# Patient Record
Sex: MALE | Race: BLACK OR AFRICAN AMERICAN | NOT HISPANIC OR LATINO | URBAN - METROPOLITAN AREA
[De-identification: names, ages, dates, MRNs, and addresses within clinical notes are randomized per-mention and may not be internally consistent; named-entity substitution may affect disease eponyms.]

---

## 2017-04-06 ENCOUNTER — EMERGENCY (EMERGENCY)
Facility: HOSPITAL | Age: 52
LOS: 1 days | Discharge: HOME | End: 2017-04-06
Attending: EMERGENCY MEDICINE | Admitting: EMERGENCY MEDICINE
Payer: SELF-PAY

## 2017-04-06 VITALS
SYSTOLIC BLOOD PRESSURE: 175 MMHG | OXYGEN SATURATION: 100 % | HEART RATE: 123 BPM | RESPIRATION RATE: 16 BRPM | TEMPERATURE: 99 F | DIASTOLIC BLOOD PRESSURE: 114 MMHG

## 2017-04-06 VITALS
HEART RATE: 91 BPM | OXYGEN SATURATION: 96 % | RESPIRATION RATE: 18 BRPM | SYSTOLIC BLOOD PRESSURE: 132 MMHG | DIASTOLIC BLOOD PRESSURE: 68 MMHG

## 2017-04-06 PROCEDURE — 99053 MED SERV 10PM-8AM 24 HR FAC: CPT

## 2017-04-06 PROCEDURE — 99283 EMERGENCY DEPT VISIT LOW MDM: CPT | Mod: 25

## 2017-04-06 NOTE — ED ADULT TRIAGE NOTE - CHIEF COMPLAINT QUOTE
pt comes to ED by EMS & [employer]. EMS was called by a bystander pt was screaming outside of a neighbor's house. pt denies ETOH pt appears intoxicated. pt is not making sense in triage. pt has DM FS in triage is 90. pt HR is elevated in triage and BP is elevated. EKG performed in triage. pt comes to ED by EMS & [employer]. EMS was called by a bystander pt was screaming outside of a neighbor's house. pt denies ETOH pt appears intoxicated. pt is not making sense in triage pt is A&0x1 at this time. pt knows his name pt does not know where he is or the year.  pt has DM FS in triage is 90. pt HR is elevated in triage and BP is elevated. EKG performed in triage. ROSAMARIA called to assess the patient in triage. No code stroke called in triage.

## 2017-04-06 NOTE — ED ADULT NURSE REASSESSMENT NOTE - NS ED NURSE REASSESS COMMENT FT1
Patient sleeping in bed and appears to be in no apparent distress.  Vitals taken and will continue to monitor patient

## 2017-04-06 NOTE — ED PROVIDER NOTE - PROGRESS NOTE DETAILS
pt is alert and clinically sober at this time with normal speech and gait.  Requesting to go home.  Will take a taxi and knows his address.

## 2017-04-06 NOTE — ED PROVIDER NOTE - ENMT, MLM
Airway patent, Nasal mucosa clear. Mouth with normal mucosa. Throat has no vesicles, no oropharyngeal exudates and uvula is midline. ETOH odor on breath.

## 2017-04-06 NOTE — ED ADULT NURSE NOTE - OBJECTIVE STATEMENT
Patient brought into ED by EMS and [employer] for ETOH intoxication.  Patient is a 52 y/o male, awake, A&O x 3, NAD, EMS and PD called by neighbor because patient was yelling out of the house.  Patient appears to be intoxicated with a smell of alcohol noted.  Patient urinated on himself.  Patient states, he was walking home after drinking ETOH and PD stopped him.  Patient denies any past medical history and denies any symptoms at this time.  Patient changed into a gown with assistance from IFEOMA Andersen and patient belongings placed in cabinet across from rm 21.  Vitals taken and will continue to monitor patient.

## 2017-04-06 NOTE — ED PROVIDER NOTE - OBJECTIVE STATEMENT
52 y/o m bibems for agitation. Patient was yelling outside neighbors home. States he drank etoh, does not know how much.  Denies any medical complaints, falls, trauma.

## 2017-04-06 NOTE — ED PROVIDER NOTE - ATTENDING CONTRIBUTION TO CARE
pt brought in by EMS after yelling in the street.  He is intoxicated by report and there is AOB.  Pt exam is not remarkable.  No signs of trauma.  No indication for imaging or labs.  No reported psych history when searched in the system by  team.  Will observe for clinical sobriety.

## 2017-04-06 NOTE — ED ADULT NURSE NOTE - CHIEF COMPLAINT QUOTE
pt comes to ED by EMS & [employer]. EMS was called by a bystander pt was screaming outside of a neighbor's house. pt denies ETOH pt appears intoxicated. pt is not making sense in triage pt is A&0x1 at this time. pt knows his name pt does not know where he is or the year.  pt has DM FS in triage is 90. pt HR is elevated in triage and BP is elevated. EKG performed in triage. ROSAMARIA called to assess the patient in triage. No code stroke called in triage.

## 2019-12-29 ENCOUNTER — EMERGENCY (EMERGENCY)
Facility: HOSPITAL | Age: 54
LOS: 1 days | Discharge: HOME | End: 2019-12-29
Attending: EMERGENCY MEDICINE | Admitting: EMERGENCY MEDICINE
Payer: COMMERCIAL

## 2019-12-29 VITALS
RESPIRATION RATE: 16 BRPM | SYSTOLIC BLOOD PRESSURE: 150 MMHG | DIASTOLIC BLOOD PRESSURE: 86 MMHG | OXYGEN SATURATION: 95 % | TEMPERATURE: 98 F | HEART RATE: 114 BPM

## 2019-12-29 VITALS
RESPIRATION RATE: 17 BRPM | OXYGEN SATURATION: 96 % | DIASTOLIC BLOOD PRESSURE: 90 MMHG | TEMPERATURE: 99 F | SYSTOLIC BLOOD PRESSURE: 147 MMHG | HEART RATE: 100 BPM

## 2019-12-29 PROCEDURE — 99284 EMERGENCY DEPT VISIT MOD MDM: CPT | Mod: 25

## 2019-12-29 PROCEDURE — 29515 APPLICATION SHORT LEG SPLINT: CPT | Mod: LT

## 2019-12-29 PROCEDURE — 73630 X-RAY EXAM OF FOOT: CPT | Mod: 26,LT

## 2019-12-29 PROCEDURE — 73610 X-RAY EXAM OF ANKLE: CPT | Mod: 26,LT

## 2019-12-29 RX ORDER — IBUPROFEN 200 MG
600 TABLET ORAL ONCE
Refills: 0 | Status: COMPLETED | OUTPATIENT
Start: 2019-12-29 | End: 2019-12-29

## 2019-12-29 RX ADMIN — Medication 600 MILLIGRAM(S): at 19:39

## 2019-12-29 NOTE — ED PROVIDER NOTE - PATIENT PORTAL LINK FT
You can access the FollowMyHealth Patient Portal offered by [facility] by registering at the following website: [email or web address]. By joining [employer]’s FollowMyHealth portal, you will also be able to view your health information using other applications (apps) compatible with our system.

## 2019-12-29 NOTE — ED PROVIDER NOTE - CARE PLAN
Principal Discharge DX:	Closed nondisplaced fracture of third metatarsal bone, unspecified laterality, initial encounter

## 2019-12-29 NOTE — ED PROCEDURE NOTE - CPROC ED POST PROC CARE GUIDE1
Verbal/written post procedure instructions were given to patient/caregiver./Elevate the injured extremity as instructed./Instructed patient/caregiver to follow-up with primary care physician./Keep the cast/splint/dressing clean and dry./Instructed patient/caregiver regarding signs and symptoms of infection.

## 2019-12-29 NOTE — PROVIDER CONTACT NOTE (OTHER) - ASSESSMENT
SW contacted by medical team Pt requires taxi assistance. SW met with pt who states he has no money to get home.  Pt is on crutches and has no one to pick him up.  IRIS contacted Makayla Leo and received confirmation taxi arriving in 8 minutes.  SW informed Pt and team.  SW intervention required at this time.

## 2019-12-29 NOTE — ED ADULT TRIAGE NOTE - CHIEF COMPLAINT QUOTE
states " I stepped in to a pothole and started to have pain and swelling to left foot since last night " swelling to left foot noted.

## 2019-12-29 NOTE — ED PROVIDER NOTE - PROGRESS NOTE DETAILS
X-ray shows 2nd and 3rd metatarsal fracture. Will place bulky [place] posterior splint and f/u with podiatry.

## 2019-12-29 NOTE — ED PROVIDER NOTE - OBJECTIVE STATEMENT
55 y/o male with no PMHx presents to the ED c/o L midfoot pain since yesterday. Pt states he stepped in pothole yesterday. Reports pain with ambulation since then. No LOC. No other acute complaints at time of eval.

## 2019-12-29 NOTE — ED PROVIDER NOTE - PHYSICAL EXAMINATION
MSK: L foot: +tenderness over midfoot. Pain with all movement. Pulses intact. Capillary refill intact.

## 2020-06-10 ENCOUNTER — EMERGENCY (EMERGENCY)
Facility: HOSPITAL | Age: 55
LOS: 0 days | Discharge: HOME | End: 2020-06-10
Attending: EMERGENCY MEDICINE
Payer: COMMERCIAL

## 2020-06-10 VITALS
OXYGEN SATURATION: 95 % | DIASTOLIC BLOOD PRESSURE: 87 MMHG | SYSTOLIC BLOOD PRESSURE: 168 MMHG | HEART RATE: 140 BPM | TEMPERATURE: 98 F | RESPIRATION RATE: 18 BRPM

## 2020-06-10 VITALS
SYSTOLIC BLOOD PRESSURE: 134 MMHG | RESPIRATION RATE: 19 BRPM | OXYGEN SATURATION: 96 % | HEART RATE: 120 BPM | TEMPERATURE: 99 F | DIASTOLIC BLOOD PRESSURE: 90 MMHG

## 2020-06-10 PROCEDURE — 99284 EMERGENCY DEPT VISIT MOD MDM: CPT

## 2020-06-10 NOTE — ED PROVIDER NOTE - CONSTITUTIONAL, MLM
normal... Well appearing, awake, alert, oriented to person, place, time/situation and in no apparent distress. + alcohol in breath, steady walking. Well appearing, awake, alert, oriented to person, place, time/situation and in no apparent distress. + alcohol on breath, steady walking.

## 2020-06-10 NOTE — ED PROVIDER NOTE - CARDIAC, MLM
Normal rate, regular rhythm.  Heart sounds S1, S2.  No murmurs, rubs or gallops. tachycardia, regular rhythm.  Heart sounds S1, S2.  No murmurs, rubs or gallops.

## 2020-06-10 NOTE — ED PROVIDER NOTE - CLINICAL SUMMARY MEDICAL DECISION MAKING FREE TEXT BOX
pt HR initially 140 at triage, during assessment noted HR is 110-120 when pt calm. Otherwise no signs of withdrawal, pt without any tremors and A&Ox3 - will dc.

## 2020-06-10 NOTE — ED ADULT NURSE NOTE - HPI (INCLUDE ILLNESS QUALITY, SEVERITY, DURATION, TIMING, CONTEXT, MODIFYING FACTORS, ASSOCIATED SIGNS AND SYMPTOMS)
patient brought in found sleeping outside, patient reports EMS came to his home and brought him into ED. Patient demanding to be discharged, requesting belongings. Patient ambulating with steady gait, alert and oriented to person and place.

## 2020-06-10 NOTE — ED ADULT NURSE NOTE - TEMPLATE LIST FOR HEAD TO TOE ASSESSMENT
Upper GI Xray from 6/28/17 sent to Dr. Ping Bhandari office at [facility] at [phone]. Psych/Behavioral

## 2020-06-10 NOTE — ED ADULT NURSE NOTE - NSIMPLEMENTINTERV_GEN_ALL_ED
Implemented All Fall Risk Interventions:  [place] to call system. Call bell, personal items and telephone within reach. Instruct patient to call for assistance. Room bathroom lighting operational. Non-slip footwear when patient is off stretcher. Physically safe environment: no spills, clutter or unnecessary equipment. Stretcher in lowest position, wheels locked, appropriate side rails in place. Provide visual cue, wrist band, yellow gown, etc. Monitor gait and stability. Monitor for mental status changes and reorient to person, place, and time. Review medications for side effects contributing to fall risk. Reinforce activity limits and safety measures with patient and family.

## 2020-06-10 NOTE — ED ADULT TRIAGE NOTE - CHIEF COMPLAINT QUOTE
pt arrived to ED via EMS states he was unconscious in the street, pt aggressive and uncooperative upon arrival to ED. pt refuses to answer questions regarding care at triage

## 2020-06-10 NOTE — ED ADULT NURSE NOTE - CAS DISCH TRANSFER METHOD
patient escorted to cab, ambulated to main ed waiting area unassisted and with steady gait./Transportation service

## 2020-06-10 NOTE — ED PROVIDER NOTE - OBJECTIVE STATEMENT
53 y/o M with no pertinent pmhx present to ER as EMS picked him up from the street laying down today. Pt otherwise states he was drinking earlier today and does so everyday. In ER pt is aox3, able to walk, and asking to leave. Pt was noted to have tachycardia at triage but has improved to 110/120. Pt has no tremors, no signs of alcohol withdrawal, and no other complaints. Pt wants to leave.

## 2020-06-10 NOTE — ED PROVIDER NOTE - PATIENT PORTAL LINK FT
You can access the FollowMyHealth Patient Portal offered by [facility] by registering at the following website: [email or web address]. By joining [employer]’s FollowMyHealth portal, you will also be able to view your health information using other applications (apps) compatible with our system.

## 2020-06-11 DIAGNOSIS — F60.3 BORDERLINE PERSONALITY DISORDER: ICD-10-CM

## 2020-06-11 DIAGNOSIS — F10.129 ALCOHOL ABUSE WITH INTOXICATION, UNSPECIFIED: ICD-10-CM

## 2021-11-19 ENCOUNTER — EMERGENCY (EMERGENCY)
Facility: HOSPITAL | Age: 56
LOS: 0 days | Discharge: HOME | End: 2021-11-20
Attending: EMERGENCY MEDICINE
Payer: COMMERCIAL

## 2021-11-19 VITALS
HEIGHT: 70 IN | TEMPERATURE: 99 F | RESPIRATION RATE: 18 BRPM | WEIGHT: 134.92 LBS | OXYGEN SATURATION: 99 % | SYSTOLIC BLOOD PRESSURE: 114 MMHG | HEART RATE: 123 BPM | DIASTOLIC BLOOD PRESSURE: 56 MMHG
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
## 2021-11-19 DIAGNOSIS — F10.129 ALCOHOL ABUSE WITH INTOXICATION, UNSPECIFIED: ICD-10-CM

## 2021-11-19 LAB — GLUCOSE BLDC GLUCOMTR-MCNC: 114 MG/DL — HIGH (ref 70–99)

## 2021-11-19 PROCEDURE — 99284 EMERGENCY DEPT VISIT MOD MDM: CPT

## 2021-11-19 NOTE — ED ADULT NURSE NOTE - OBJECTIVE STATEMENT
Pt presents to the ED co intox. Pt has not been interviewed by RN prior to leaving. Pt assessed and DC by Dr Alvarado.

## 2021-11-19 NOTE — ED PROVIDER NOTE - OBJECTIVE STATEMENT
This patient is a 55 year old man who presents to the ER via EMS for suspected alcohol intoxication.  Patient was reported to be uncooperative with EMS and nursing staff.  He has no complaints at this time.  He denies injury, fall or trauma.

## 2021-11-19 NOTE — ED ADULT NURSE NOTE - CHIEF COMPLAINT QUOTE
BIBA,  intoxication.  smell of alcohol on breath.    pt aggressive per EMS,  striking out at staff.  pt cuffed by [facility] for safety.    found on street in [place].  pt talking vulgar in triage.  pt denies etoh/drugs

## 2021-11-19 NOTE — ED PROVIDER NOTE - CLINICAL SUMMARY MEDICAL DECISION MAKING FREE TEXT BOX
Suspected alcohol intox.  No signs of trauma.  Will call family to  patient to take patient home safely.

## 2021-11-19 NOTE — ED ADULT TRIAGE NOTE - CHIEF COMPLAINT QUOTE
BIBA,  intoxication.  smell of alcohol on breath.    pt aggressive per EMS,  striking out at staff.  pt cuffed by [facility] for safety.    found on street in [place].  pt talking vulgar in triage.  pt denies etoh/drugs

## 2021-11-19 NOTE — ED PROVIDER NOTE - PATIENT PORTAL LINK FT
You can access the FollowMyHealth Patient Portal offered by [facility] by registering at the following website: [email or web address]. By joining [employer]’s FollowMyHealth portal, you will also be able to view your health information using other applications (apps) compatible with our system.

## 2021-11-19 NOTE — ED PROVIDER NOTE - NSFOLLOWUPINSTRUCTIONS_ED_ALL_ED_FT
1) DO NOT ABUSE ALCOHOL  2) Follow up with your primary care doctor  3) Return to the ER for worsening or concerning symptoms

## 2021-11-19 NOTE — ED PROVIDER NOTE - PROGRESS NOTE DETAILS
Phone number that patient provided for his son the person who answers that he is not related to the patient and it is the wrong number. Patient's family member in the waiting room to  patient.

## 2022-07-17 ENCOUNTER — EMERGENCY (EMERGENCY)
Facility: HOSPITAL | Age: 57
LOS: 0 days | Discharge: HOME | End: 2022-07-18
Attending: EMERGENCY MEDICINE

## 2022-07-17 VITALS
WEIGHT: 149.91 LBS | SYSTOLIC BLOOD PRESSURE: 181 MMHG | TEMPERATURE: 99 F | OXYGEN SATURATION: 95 % | RESPIRATION RATE: 18 BRPM | HEIGHT: 70 IN | HEART RATE: 129 BPM | DIASTOLIC BLOOD PRESSURE: 100 MMHG

## 2022-07-17 DIAGNOSIS — F10.920 ALCOHOL USE, UNSPECIFIED WITH INTOXICATION, UNCOMPLICATED: ICD-10-CM

## 2022-07-17 DIAGNOSIS — F10.129 ALCOHOL ABUSE WITH INTOXICATION, UNSPECIFIED: ICD-10-CM

## 2022-07-17 DIAGNOSIS — R00.0 TACHYCARDIA, UNSPECIFIED: ICD-10-CM

## 2022-07-17 LAB
ALBUMIN SERPL ELPH-MCNC: 4.1 G/DL — SIGNIFICANT CHANGE UP (ref 3.3–5)
ALP SERPL-CCNC: 130 U/L — HIGH (ref 40–120)
ALT FLD-CCNC: 50 U/L — SIGNIFICANT CHANGE UP (ref 12–78)
ANION GAP SERPL CALC-SCNC: 13 MMOL/L — SIGNIFICANT CHANGE UP (ref 5–17)
AST SERPL-CCNC: 97 U/L — HIGH (ref 15–37)
BASOPHILS # BLD AUTO: 0.04 K/UL — SIGNIFICANT CHANGE UP (ref 0–0.2)
BASOPHILS NFR BLD AUTO: 0.7 % — SIGNIFICANT CHANGE UP (ref 0–2)
BILIRUB SERPL-MCNC: 0.2 MG/DL — SIGNIFICANT CHANGE UP (ref 0.2–1.2)
BUN SERPL-MCNC: 13 MG/DL — SIGNIFICANT CHANGE UP (ref 7–23)
CALCIUM SERPL-MCNC: 9.1 MG/DL — SIGNIFICANT CHANGE UP (ref 8.5–10.1)
CHLORIDE SERPL-SCNC: 103 MMOL/L — SIGNIFICANT CHANGE UP (ref 96–108)
CO2 SERPL-SCNC: 28 MMOL/L — SIGNIFICANT CHANGE UP (ref 22–31)
CREAT SERPL-MCNC: 0.89 MG/DL — SIGNIFICANT CHANGE UP (ref 0.5–1.3)
EGFR: 100 ML/MIN/1.73M2 — SIGNIFICANT CHANGE UP
EOSINOPHIL # BLD AUTO: 0.34 K/UL — SIGNIFICANT CHANGE UP (ref 0–0.5)
EOSINOPHIL NFR BLD AUTO: 5.5 % — SIGNIFICANT CHANGE UP (ref 0–6)
ETHANOL SERPL-MCNC: 449 MG/DL — SIGNIFICANT CHANGE UP (ref 0–10)
GLUCOSE BLDC GLUCOMTR-MCNC: 134 MG/DL — HIGH (ref 70–99)
GLUCOSE SERPL-MCNC: 154 MG/DL — HIGH (ref 70–99)
HCT VFR BLD CALC: 36.9 % — LOW (ref 39–50)
HGB BLD-MCNC: 12.5 G/DL — LOW (ref 13–17)
IMM GRANULOCYTES NFR BLD AUTO: 0.3 % — SIGNIFICANT CHANGE UP (ref 0–1.5)
LYMPHOCYTES # BLD AUTO: 2.48 K/UL — SIGNIFICANT CHANGE UP (ref 1–3.3)
LYMPHOCYTES # BLD AUTO: 40.4 % — SIGNIFICANT CHANGE UP (ref 13–44)
MAGNESIUM SERPL-MCNC: 2 MG/DL — SIGNIFICANT CHANGE UP (ref 1.6–2.6)
MCHC RBC-ENTMCNC: 30.7 PG — SIGNIFICANT CHANGE UP (ref 27–34)
MCHC RBC-ENTMCNC: 33.9 G/DL — SIGNIFICANT CHANGE UP (ref 32–36)
MCV RBC AUTO: 90.7 FL — SIGNIFICANT CHANGE UP (ref 80–100)
MONOCYTES # BLD AUTO: 0.73 K/UL — SIGNIFICANT CHANGE UP (ref 0–0.9)
MONOCYTES NFR BLD AUTO: 11.9 % — SIGNIFICANT CHANGE UP (ref 2–14)
NEUTROPHILS # BLD AUTO: 2.53 K/UL — SIGNIFICANT CHANGE UP (ref 1.8–7.4)
NEUTROPHILS NFR BLD AUTO: 41.2 % — LOW (ref 43–77)
NRBC # BLD: 0 /100 WBCS — SIGNIFICANT CHANGE UP (ref 0–0)
PLATELET # BLD AUTO: 155 K/UL — SIGNIFICANT CHANGE UP (ref 150–400)
POTASSIUM SERPL-MCNC: 3.4 MMOL/L — LOW (ref 3.5–5.3)
POTASSIUM SERPL-SCNC: 3.4 MMOL/L — LOW (ref 3.5–5.3)
PROT SERPL-MCNC: 8.6 GM/DL — HIGH (ref 6–8.3)
RBC # BLD: 4.07 M/UL — LOW (ref 4.2–5.8)
RBC # FLD: 14.8 % — HIGH (ref 10.3–14.5)
SODIUM SERPL-SCNC: 144 MMOL/L — SIGNIFICANT CHANGE UP (ref 135–145)
WBC # BLD: 6.14 K/UL — SIGNIFICANT CHANGE UP (ref 3.8–10.5)
WBC # FLD AUTO: 6.14 K/UL — SIGNIFICANT CHANGE UP (ref 3.8–10.5)

## 2022-07-17 PROCEDURE — 99285 EMERGENCY DEPT VISIT HI MDM: CPT

## 2022-07-17 PROCEDURE — 93010 ELECTROCARDIOGRAM REPORT: CPT

## 2022-07-17 RX ORDER — SODIUM CHLORIDE 9 MG/ML
1000 INJECTION, SOLUTION INTRAVENOUS ONCE
Refills: 0 | Status: COMPLETED | OUTPATIENT
Start: 2022-07-17 | End: 2022-07-17

## 2022-07-17 RX ORDER — THIAMINE MONONITRATE (VIT B1) 100 MG
500 TABLET ORAL ONCE
Refills: 0 | Status: COMPLETED | OUTPATIENT
Start: 2022-07-17 | End: 2022-07-17

## 2022-07-17 RX ORDER — MIDAZOLAM HYDROCHLORIDE 1 MG/ML
4 INJECTION, SOLUTION INTRAMUSCULAR; INTRAVENOUS ONCE
Refills: 0 | Status: DISCONTINUED | OUTPATIENT
Start: 2022-07-17 | End: 2022-07-17

## 2022-07-17 RX ORDER — HALOPERIDOL DECANOATE 100 MG/ML
5 INJECTION INTRAMUSCULAR ONCE
Refills: 0 | Status: COMPLETED | OUTPATIENT
Start: 2022-07-17 | End: 2022-07-17

## 2022-07-17 RX ADMIN — MIDAZOLAM HYDROCHLORIDE 4 MILLIGRAM(S): 1 INJECTION, SOLUTION INTRAMUSCULAR; INTRAVENOUS at 22:05

## 2022-07-17 RX ADMIN — SODIUM CHLORIDE 1000 MILLILITER(S): 9 INJECTION, SOLUTION INTRAVENOUS at 22:15

## 2022-07-17 RX ADMIN — Medication 105 MILLIGRAM(S): at 22:53

## 2022-07-17 RX ADMIN — HALOPERIDOL DECANOATE 5 MILLIGRAM(S): 100 INJECTION INTRAMUSCULAR at 22:05

## 2022-07-17 NOTE — ED PROVIDER NOTE - PHYSICAL EXAMINATION
Gen: Disheveled, intoxicated, NAD  Head: NC, AT   Eyes: PERRL, EOMI, normal lids/conjunctiva  ENT: normal hearing, patent oropharynx without erythema/exudate, uvula midline  Neck: supple, no tenderness, Trachea midline  Pulm: Bilateral BS, normal resp effort, no wheeze/stridor/retractions  CV: RRR, no M/R/G, 2+ radial and dp pulses bl, no edema  Abd: soft, NT/ND, +BS, no hepatosplenomegaly  Mskel: extremities x4 with normal ROM and no joint effusions. no ctl spine ttp.   Skin: no rash, no bruising   Neuro: Too intoxicated to answer orientation questions

## 2022-07-17 NOTE — ED ADULT TRIAGE NOTE - CHIEF COMPLAINT QUOTE
BIBEMS, as per ems bystander called, pt was in front of house, as per EMS drank 2 pints of rum today, fs 162. ptrefuse to answer further questions, states " I can talk in 7 different languages" BIBEMS, as per ems bystander called, pt was in front of house, as per EMS drank 2 pints of rum today, fs 162. ptrefuse to answer further questions, states " I can talk in 7 different languages"  unknown: Loc, head trauma

## 2022-07-17 NOTE — ED PROVIDER NOTE - OBJECTIVE STATEMENT
55 y/o M with no pertinent PMHx presents to the ED BIBEMS for intoxication. As per EMS and triage, bystander called EMS as pt was in front of bystander's house and reports pt had drank x2 pints of rum today. Pt uncooperative in the ED, refusing to answer further questions.

## 2022-07-17 NOTE — ED PROVIDER NOTE - PATIENT PORTAL LINK FT
You can access the FollowMyHealth Patient Portal offered by [facility] by registering at the following website: [email or web address]. By joining [employer]’s FollowMyHealth portal, you will also be able to view your health information using other applications (apps) compatible with our system.

## 2022-07-18 VITALS
OXYGEN SATURATION: 96 % | TEMPERATURE: 97 F | HEART RATE: 100 BPM | RESPIRATION RATE: 18 BRPM | SYSTOLIC BLOOD PRESSURE: 158 MMHG | DIASTOLIC BLOOD PRESSURE: 97 MMHG

## 2022-07-18 PROCEDURE — 70450 CT HEAD/BRAIN W/O DYE: CPT | Mod: 26,MA

## 2022-07-18 NOTE — ED ADULT NURSE NOTE - ED STAT RN HANDOFF DETAILS
Report endorsed to oncoming RNDallas. Safety checks completed this shift. Safety rounds completed hourly.  IV sites checked Q2+remains WDL. Fall & skin precautions in place. Any issues endorsed to oncoming RN for follow up.

## 2022-07-18 NOTE — ED ADULT NURSE REASSESSMENT NOTE - NS ED NURSE REASSESS COMMENT FT1
Pt sleeping comfortably in bed, easy to arouse, AAOx4 when awake. No complaints at this time. Respirations equal and unlabored. No acute distress noted at this time.

## 2022-07-18 NOTE — ED ADULT NURSE REASSESSMENT NOTE - NS ED NURSE REASSESS COMMENT FT1
Pt AAOx4. Pt sitting comfortably in bed with no complaints at this time. Respirations equal and unlabored. No acute distress noted at this time. Pt ambulatory with stable gait.  Tried to discharge, but patient wants to wait for social work for [facility] card.

## 2022-07-18 NOTE — ED ADULT NURSE NOTE - NSIMPLEMENTINTERV_GEN_ALL_ED
Implemented All Fall Risk Interventions:  [place] to call system. Call bell, personal items and telephone within reach. Instruct patient to call for assistance. Room bathroom lighting operational. Non-slip footwear when patient is off stretcher. Physically safe environment: no spills, clutter or unnecessary equipment. Stretcher in lowest position, wheels locked, appropriate side rails in place. Provide visual cue, wrist band, yellow gown, etc. Monitor gait and stability. Monitor for mental status changes and reorient to person, place, and time. Review medications for side effects contributing to fall risk. Reinforce activity limits and safety measures with patient and family.

## 2022-07-18 NOTE — ED ADULT NURSE REASSESSMENT NOTE - NS ED NURSE REASSESS COMMENT FT1
Covering for primary RN Yesica. Pt sleeping in bed, maintaining on 4L NC, appears in NAD, on cardiac monitor. Pending CT scan.

## 2022-07-18 NOTE — ED ADULT NURSE NOTE - CHIEF COMPLAINT QUOTE
BIBEMS, as per ems bystander called, pt was in front of house, as per EMS drank 2 pints of rum today, fs 162. ptrefuse to answer further questions, states " I can talk in 7 different languages"  unknown: Loc, head trauma

## 2022-07-18 NOTE — ED ADULT NURSE NOTE - OBJECTIVE STATEMENT
56 year old male came in as per ems bystander called, pt was in front of house, as per EMS drank 2 pints of rum today, fs 162. ptrefuse to answer further questions, states " I can talk in 7 different languages", upon arrival pt was combative, and upon Doctor Iliobachie orders pt is iven medication to be sedated, IV placed, placed on the monitors.  unknown: Loc, head trauma

## 2022-09-08 NOTE — ED ADULT TRIAGE NOTE - PATIENT ON (OXYGEN DELIVERY METHOD)
Likely venous insufficiency.  Echo WNL.      - compression stockings  -BLE venous doppler: 6/23/22  Impression:     No evidence of deep venous thrombosis in either lower extremity.     -BLE arterial doppler: 6/23/22  Impression:     Findings consistent with a hemodynamically significant stenosis within the proximal left superficial femoral artery.    - COLIN and TBI  Study 7/8/22  Conclusion    Normal ABIs and TBIs bilaterally.    -US BLE to evaluate for venous insufficiency   -PAD rehab  -continue cilostazol 50 mg BID    room air
